# Patient Record
Sex: MALE | Race: OTHER | Employment: UNEMPLOYED | URBAN - METROPOLITAN AREA
[De-identification: names, ages, dates, MRNs, and addresses within clinical notes are randomized per-mention and may not be internally consistent; named-entity substitution may affect disease eponyms.]

---

## 2019-08-16 ENCOUNTER — HOSPITAL ENCOUNTER (EMERGENCY)
Age: 39
Discharge: HOME | End: 2019-08-16
Attending: EMERGENCY MEDICINE
Payer: SELF-PAY

## 2019-08-16 VITALS
RESPIRATION RATE: 16 BRPM | SYSTOLIC BLOOD PRESSURE: 128 MMHG | HEART RATE: 94 BPM | TEMPERATURE: 98.1 F | OXYGEN SATURATION: 98 % | DIASTOLIC BLOOD PRESSURE: 76 MMHG
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
## 2019-08-16 PROCEDURE — 99283 EMERGENCY DEPT VISIT LOW MDM: CPT | Performed by: PHYSICIAN ASSISTANT

## 2019-08-16 RX ORDER — IBUPROFEN 800 MG/1
800 TABLET ORAL
Status: DISCONTINUED | OUTPATIENT
Start: 2019-08-16 | End: 2019-08-16 | Stop reason: HOSPADM

## 2019-08-16 RX ORDER — OXYCODONE AND ACETAMINOPHEN 5; 325 MG/1; MG/1
1 TABLET ORAL
Status: DISCONTINUED | OUTPATIENT
Start: 2019-08-16 | End: 2019-08-16 | Stop reason: HOSPADM

## 2019-08-16 NOTE — ED TRIAGE NOTES
Patient states he picked up the wrong ring and put it on his finger last night and cannot get it off. Patient has band on left ring finger that he unsure of what it is made of.

## 2019-08-16 NOTE — DISCHARGE INSTRUCTIONS
Keep left hand elevated with cool compresses applied to affected finger as tolerated. May use Tylenol or Motrin as directed for pain or swelling.

## 2019-08-16 NOTE — ED NOTES
I have reviewed discharge instructions with the patient. The patient verbalized understanding. Patient left ED via Discharge Method: ambulatory to Home with friend- bus ticket given by social work    Opportunity for questions and clarification provided. Patient given 0 scripts. To continue your aftercare when you leave the hospital, you may receive an automated call from our care team to check in on how you are doing. This is a free service and part of our promise to provide the best care and service to meet your aftercare needs.  If you have questions, or wish to unsubscribe from this service please call [phone]. Thank you for Choosing our [facility] Emergency Department.

## 2019-08-16 NOTE — ED PROVIDER NOTES
30-year-old male presents with tungsten ring stuck on left ring finger. He states that he picked up and put the wrong ring on his finger at approximately 2 AM today. He states finger is swollen and painful and he has not been able to remove it with his own efforts. The history is provided by the patient. Finger Pain    This is a new problem. The current episode started 12 to 24 hours ago. The problem occurs constantly. The problem has not changed since onset. The pain is present in the left hand (Left ring finger). The quality of the pain is described as dull. The pain is moderate. Associated symptoms include limited range of motion and stiffness. Pertinent negatives include no numbness and no tingling. The symptoms are aggravated by movement. He has tried nothing for the symptoms. There has been no history of extremity trauma. No past medical history on file. Past Surgical History:   Procedure Laterality Date    HX ORTHOPAEDIC           No family history on file. Social History     Socioeconomic History    Marital status: SINGLE     Spouse name: Not on file    Number of children: Not on file    Years of education: Not on file    Highest education level: Not on file   Occupational History    Not on file   Social Needs    Financial resource strain: Not on file    Food insecurity:     Worry: Not on file     Inability: Not on file    Transportation needs:     Medical: Not on file     Non-medical: Not on file   Tobacco Use    Smoking status: Current Every Day Smoker    Smokeless tobacco: Never Used   Substance and Sexual Activity    Alcohol use:  Yes    Drug use: Yes     Types: Marijuana    Sexual activity: Not on file   Lifestyle    Physical activity:     Days per week: Not on file     Minutes per session: Not on file    Stress: Not on file   Relationships    Social connections:     Talks on phone: Not on file     Gets together: Not on file     Attends Manda[employer] service: Not on file Active member of club or organization: Not on file     Attends meetings of clubs or organizations: Not on file     Relationship status: Not on file    Intimate partner violence:     Fear of current or ex partner: Not on file     Emotionally abused: Not on file     Physically abused: Not on file     Forced sexual activity: Not on file   Other Topics Concern    Not on file   Social History Narrative    Not on file         ALLERGIES: Patient has no known allergies. Review of Systems   Constitutional: Negative. Negative for chills and fever. Gastrointestinal: Negative for nausea and vomiting. Musculoskeletal: Positive for arthralgias, joint swelling and stiffness. Skin: Negative. Neurological: Negative. Negative for tingling and numbness. All other systems reviewed and are negative. Vitals:    08/16/19 1206   BP: 137/77   Pulse: (!) 103   Resp: 18   Temp: 98.3 °F (36.8 °C)   SpO2: 97%            Physical Exam   Constitutional: He is oriented to person, place, and time. Vital signs are normal. He appears well-developed and well-nourished. He is active. Non-toxic appearance. He does not appear ill. No distress. Musculoskeletal:        Left hand: He exhibits decreased range of motion, tenderness and swelling. He exhibits no bony tenderness and normal capillary refill. Normal sensation noted. Decreased strength noted. Hands:  Neurological: He is alert and oriented to person, place, and time. No sensory deficit. Skin: Skin is warm, dry and intact. Capillary refill takes less than 2 seconds. No bruising noted. No erythema. Psychiatric: He has a normal mood and affect. His speech is normal and behavior is normal.   Nursing note and vitals reviewed. MDM  Number of Diagnoses or Management Options  Diagnosis management comments: 13-year-old male presented with tungsten ring stuck on left ring finger. Multiple attempts made with dental floss and sutures to remove ring to no avail. Ring cutter used in ring was successfully removed from patient's finger. Patient denies any pain associated with ring being on finger and declines any pain medication at this time. I discussed with patient he should keep his finger elevated and apply cool compresses if needed. Left ring finger is reevaluated and does not appear to be edematous any longer. No tenderness noted.     Risk of Complications, Morbidity, and/or Mortality  Presenting problems: low  Diagnostic procedures: low  Management options: low    Patient Progress  Patient progress: improved         Procedures